# Patient Record
Sex: MALE | Race: WHITE | Employment: UNEMPLOYED | URBAN - METROPOLITAN AREA
[De-identification: names, ages, dates, MRNs, and addresses within clinical notes are randomized per-mention and may not be internally consistent; named-entity substitution may affect disease eponyms.]

---

## 2020-12-02 ENCOUNTER — APPOINTMENT (OUTPATIENT)
Dept: GENERAL RADIOLOGY | Age: 35
End: 2020-12-02
Attending: EMERGENCY MEDICINE
Payer: SUBSIDIZED

## 2020-12-02 ENCOUNTER — HOSPITAL ENCOUNTER (EMERGENCY)
Age: 35
Discharge: HOME | End: 2020-12-02
Attending: EMERGENCY MEDICINE
Payer: SUBSIDIZED

## 2020-12-02 VITALS
HEART RATE: 84 BPM | WEIGHT: 215 LBS | HEIGHT: 69 IN | TEMPERATURE: 97.1 F | OXYGEN SATURATION: 99 % | RESPIRATION RATE: 16 BRPM | BODY MASS INDEX: 31.84 KG/M2 | DIASTOLIC BLOOD PRESSURE: 75 MMHG | SYSTOLIC BLOOD PRESSURE: 118 MMHG

## 2020-12-02 DIAGNOSIS — S60.221A CONTUSION OF RIGHT HAND, INITIAL ENCOUNTER: Primary | ICD-10-CM

## 2020-12-02 PROCEDURE — 99282 EMERGENCY DEPT VISIT SF MDM: CPT

## 2020-12-02 PROCEDURE — 73130 X-RAY EXAM OF HAND: CPT

## 2020-12-03 NOTE — ED PROVIDER NOTES
EMERGENCY DEPARTMENT HISTORY AND PHYSICAL EXAM    Date: 12/2/2020  Patient Name: Ronnie Troncoso    History of Presenting Illness     Chief Complaint   Patient presents with    Hand Pain     right         History Provided By: Patient    Chief Complaint: hand pain    HPI(Context):   9:49 PM  Ronnie Troncoso is a 38 y.o. male who presents to the emergency department C/O right hand pain. Associated sxs include right hand swelling. Pt was playing with children when he struck his hand against headboard today. Pain with movement. Pt placed thumb splint. Pt denies numbness, weakness, reduced ROM, and any other sxs or complaints. PCP: None        Past History     Past Medical History:  History reviewed. No pertinent past medical history. Past Surgical History:  History reviewed. No pertinent surgical history. Family History:  History reviewed. No pertinent family history. Social History:  Social History     Tobacco Use    Smoking status: Never Smoker    Smokeless tobacco: Never Used   Substance Use Topics    Alcohol use: Never     Frequency: Never    Drug use: Never       Allergies: Allergies   Allergen Reactions    Penicillins Unknown (comments)     Pt can't recall childhood reaction         Review of Systems   Review of Systems   Musculoskeletal: Positive for arthralgias and joint swelling. Skin: Negative for color change. Neurological: Negative for weakness and numbness. All other systems reviewed and are negative. Physical Exam     Vitals:    12/02/20 2143   BP: 118/75   Pulse: 84   Resp: 16   Temp: 97.1 °F (36.2 °C)   SpO2: 99%   Weight: 97.5 kg (215 lb)   Height: 5' 9\" (1.753 m)     Physical Exam  Vitals signs and nursing note reviewed. Constitutional:       General: He is not in acute distress. Appearance: Normal appearance. Comments:  male in NAD. Alert. Appears comfortable. HENT:      Head: Normocephalic and atraumatic.       Right Ear: External ear normal. Left Ear: External ear normal.      Nose: Nose normal.   Eyes:      Conjunctiva/sclera: Conjunctivae normal.   Neck:      Musculoskeletal: Normal range of motion. Cardiovascular:      Rate and Rhythm: Normal rate and regular rhythm. Pulses:           Radial pulses are 2+ on the right side and 2+ on the left side. Heart sounds: Normal heart sounds. Pulmonary:      Effort: Pulmonary effort is normal.      Breath sounds: Normal breath sounds. Musculoskeletal:      Right wrist: He exhibits normal range of motion, no tenderness, no bony tenderness (no snuffbox tenderness) and no swelling. Right forearm: He exhibits no tenderness, no bony tenderness and no swelling. Right hand: He exhibits tenderness and swelling. He exhibits normal range of motion, normal capillary refill and no deformity. Normal sensation noted. Normal strength noted. Hands:    Neurological:      Mental Status: He is alert and oriented to person, place, and time. Psychiatric:         Behavior: Behavior normal.         Judgment: Judgment normal.             Diagnostic Study Results     Labs -   No results found for this or any previous visit (from the past 12 hour(s)). XR HAND RT MIN 3 V   Final Result   Impression:      No fracture. CT Results  (Last 48 hours)    None        CXR Results  (Last 48 hours)    None          Medications given in the ED-  Medications - No data to display      Medical Decision Making   I am the first provider for this patient. I reviewed the vital signs, available nursing notes, past medical history, past surgical history, family history and social history. Vital Signs-Reviewed the patient's vital signs. Pulse Oximetry Analysis - 99% on RA. NORMAL     Records Reviewed: Nursing Notes    Provider Notes (Medical Decision Making): sprain, strain, fx, contusion    Procedures:  Procedures    ED Course:   9:49 PM Initial assessment performed.  The patients presenting problems have been discussed, and they are in agreement with the care plan formulated and outlined with them. I have encouraged them to ask questions as they arise throughout their visit. Diagnosis and Disposition       Imaging unremarkable. NVI. Pt has thumb splint in place. Will tx as contusion v sprain. Reasons to RTED discussed with pt. All questions answered. Pt feels comfortable going home at this time. Pt expressed understanding and he agrees with plan. 1. Contusion of right hand, initial encounter        PLAN:  1. D/C Home  2. There are no discharge medications for this patient. 3.   Follow-up Information     Follow up With Specialties Details Why Contact Info    Charli Santiago MD Orthopedic Surgery   [address] [place] [address]  [address]      [facility] EMERGENCY DEPT Emergency Medicine  As needed, If symptoms worsen [address] [id number]  [phone]        _______________________________    Attestations: This note is prepared by Anupam Alejandro PA-C.  _______________________________          Please note that this dictation was completed with [employer], the computer voice recognition software. Quite often unanticipated grammatical, syntax, homophones, and other interpretive errors are inadvertently transcribed by the computer software. Please disregard these errors. Please excuse any errors that have escaped final proofreading.

## 2020-12-03 NOTE — ED TRIAGE NOTES
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 Aching, able to move it. Just wants to ensure it is not fractured.

## 2021-01-24 ENCOUNTER — HOSPITAL ENCOUNTER (EMERGENCY)
Age: 36
Discharge: HOME | End: 2021-01-24
Attending: EMERGENCY MEDICINE | Admitting: EMERGENCY MEDICINE
Payer: SUBSIDIZED

## 2021-01-24 ENCOUNTER — APPOINTMENT (OUTPATIENT)
Dept: GENERAL RADIOLOGY | Age: 36
End: 2021-01-24
Attending: PHYSICIAN ASSISTANT
Payer: SUBSIDIZED

## 2021-01-24 VITALS
OXYGEN SATURATION: 96 % | HEIGHT: 69 IN | RESPIRATION RATE: 16 BRPM | DIASTOLIC BLOOD PRESSURE: 59 MMHG | WEIGHT: 215 LBS | HEART RATE: 107 BPM | BODY MASS INDEX: 31.84 KG/M2 | SYSTOLIC BLOOD PRESSURE: 107 MMHG | TEMPERATURE: 99.4 F

## 2021-01-24 DIAGNOSIS — Z20.822 PERSON UNDER INVESTIGATION FOR COVID-19: ICD-10-CM

## 2021-01-24 DIAGNOSIS — R50.9 FEVER IN ADULT: Primary | ICD-10-CM

## 2021-01-24 DIAGNOSIS — B34.9 VIRAL SYNDROME: ICD-10-CM

## 2021-01-24 LAB
ATRIAL RATE: 122 BPM
B PERT DNA SPEC QL NAA+PROBE: NOT DETECTED
BORDETELLA PARAPERTUSSIS PCR, BORPAR: NOT DETECTED
C PNEUM DNA SPEC QL NAA+PROBE: NOT DETECTED
CALCULATED P AXIS, ECG09: 57 DEGREES
CALCULATED R AXIS, ECG10: 41 DEGREES
CALCULATED T AXIS, ECG11: 58 DEGREES
DIAGNOSIS, 93000: NORMAL
FLUAV AG NPH QL IA: NEGATIVE
FLUAV H1 2009 PAND RNA SPEC QL NAA+PROBE: NOT DETECTED
FLUAV H1 RNA SPEC QL NAA+PROBE: NOT DETECTED
FLUAV H3 RNA SPEC QL NAA+PROBE: NOT DETECTED
FLUAV SUBTYP SPEC NAA+PROBE: NOT DETECTED
FLUBV AG NOSE QL IA: NEGATIVE
FLUBV RNA SPEC QL NAA+PROBE: NOT DETECTED
HADV DNA SPEC QL NAA+PROBE: NOT DETECTED
HCOV 229E RNA SPEC QL NAA+PROBE: NOT DETECTED
HCOV HKU1 RNA SPEC QL NAA+PROBE: NOT DETECTED
HCOV NL63 RNA SPEC QL NAA+PROBE: NOT DETECTED
HCOV OC43 RNA SPEC QL NAA+PROBE: NOT DETECTED
HMPV RNA SPEC QL NAA+PROBE: NOT DETECTED
HPIV1 RNA SPEC QL NAA+PROBE: NOT DETECTED
HPIV2 RNA SPEC QL NAA+PROBE: NOT DETECTED
HPIV3 RNA SPEC QL NAA+PROBE: NOT DETECTED
HPIV4 RNA SPEC QL NAA+PROBE: NOT DETECTED
M PNEUMO DNA SPEC QL NAA+PROBE: NOT DETECTED
P-R INTERVAL, ECG05: 134 MS
Q-T INTERVAL, ECG07: 310 MS
QRS DURATION, ECG06: 88 MS
QTC CALCULATION (BEZET), ECG08: 441 MS
RSV RNA SPEC QL NAA+PROBE: NOT DETECTED
RV+EV RNA SPEC QL NAA+PROBE: NOT DETECTED
SARS-COV-2 PCR, COVPCR: NOT DETECTED
VENTRICULAR RATE, ECG03: 122 BPM

## 2021-01-24 PROCEDURE — 71045 X-RAY EXAM CHEST 1 VIEW: CPT

## 2021-01-24 PROCEDURE — 99284 EMERGENCY DEPT VISIT MOD MDM: CPT

## 2021-01-24 PROCEDURE — 74011250637 HC RX REV CODE- 250/637: Performed by: PHYSICIAN ASSISTANT

## 2021-01-24 PROCEDURE — 87426 SARSCOV CORONAVIRUS AG IA: CPT

## 2021-01-24 PROCEDURE — 87804 INFLUENZA ASSAY W/OPTIC: CPT

## 2021-01-24 PROCEDURE — 93005 ELECTROCARDIOGRAM TRACING: CPT

## 2021-01-24 RX ORDER — IBUPROFEN 600 MG/1
600 TABLET ORAL
Status: COMPLETED | OUTPATIENT
Start: 2021-01-24 | End: 2021-01-24

## 2021-01-24 RX ADMIN — IBUPROFEN 600 MG: 600 TABLET, FILM COATED ORAL at 17:20

## 2021-01-24 NOTE — ED PROVIDER NOTES
EMERGENCY DEPARTMENT HISTORY AND PHYSICAL EXAM    Date: 1/24/2021  Patient Name: Husam Morales    History of Presenting Illness     Chief Complaint   Patient presents with    Covid Testing         History Provided By: Patient    Husam Morales is a 38 y.o. male with no PMHX who presents to the emergency department C/O fever. Associated sxs include generalized fatigue, loss of taste. Reports acute onset of fever generalized fatigue loss of taste today also with generalized body aches. Endorses sick contacts in the home. States that his wife has similar symptoms cold-like symptoms however was tested yesterday for Covid with rapid screening negative results reported. Has had one other sick contact at work with known positive over 1 week ago. Non-smoker. Dose of Tylenol approximately 1 hour ago. Pt denies chest pain, shortness of breath, vomiting, and any other sxs or complaints. PCP: None        Past History     Past Medical History:  History reviewed. No pertinent past medical history. Past Surgical History:  History reviewed. No pertinent surgical history. Family History:  History reviewed. No pertinent family history. Social History:  Social History     Tobacco Use    Smoking status: Never Smoker    Smokeless tobacco: Never Used   Substance Use Topics    Alcohol use: Not Currently     Frequency: Never    Drug use: Never       Allergies: Allergies   Allergen Reactions    Penicillins Unknown (comments)     Pt can't recall childhood reaction         Review of Systems   Review of Systems   Constitutional: Positive for fatigue and fever. HENT: Negative for congestion. Respiratory: Positive for cough. Negative for shortness of breath. Cardiovascular: Negative for chest pain. Gastrointestinal: Negative for abdominal pain and vomiting. Musculoskeletal: Positive for myalgias. All other systems reviewed and are negative.       Physical Exam     Vitals:    01/24/21 1639 01/24/21 1800 BP: (!) 110/54 (!) 107/59   Pulse: (!) 125 (!) 107   Resp: 16    Temp: (!) 101.6 °F (38.7 °C) 99.4 °F (37.4 °C)   SpO2: 97% 96%   Weight: 97.5 kg (215 lb)    Height: 5' 9\" (1.753 m)      Physical Exam  Vitals signs and nursing note reviewed. Constitutional:       General: He is not in acute distress. Appearance: Normal appearance. He is normal weight. He is not ill-appearing or toxic-appearing. HENT:      Head: Normocephalic and atraumatic. Eyes:      Extraocular Movements: Extraocular movements intact. Conjunctiva/sclera: Conjunctivae normal.      Pupils: Pupils are equal, round, and reactive to light. Neck:      Musculoskeletal: Normal range of motion and neck supple. Cardiovascular:      Rate and Rhythm: Regular rhythm. Tachycardia present. Pulmonary:      Effort: Pulmonary effort is normal.   Musculoskeletal: Normal range of motion. Skin:     General: Skin is warm and dry. Capillary Refill: Capillary refill takes less than 2 seconds. Neurological:      General: No focal deficit present. Mental Status: He is alert and oriented to person, place, and time.    Psychiatric:         Mood and Affect: Mood normal.         Behavior: Behavior normal.       Diagnostic Study Results     Labs -     Recent Results (from the past 12 hour(s))   EKG, 12 LEAD, INITIAL    Collection Time: 01/24/21  5:06 PM   Result Value Ref Range    Ventricular Rate 122 BPM    Atrial Rate 122 BPM    P-R Interval 134 ms    QRS Duration 88 ms    Q-T Interval 310 ms    QTC Calculation (Bezet) 441 ms    Calculated P Axis 57 degrees    Calculated R Axis 41 degrees    Calculated T Axis 58 degrees    Diagnosis       Sinus tachycardia  Otherwise normal ECG  No previous ECGs available     INFLUENZA A & B AG (RAPID TEST)    Collection Time: 01/24/21  5:10 PM   Result Value Ref Range    Influenza A Antigen Negative NEG      Influenza B Antigen Negative NEG         Radiologic Studies -   XR CHEST PORT   Final Result   No acute process        CT Results  (Last 48 hours)    None        CXR Results  (Last 48 hours)               01/24/21 1715  XR CHEST PORT Final result    Impression:  No acute process       Narrative:  EXAM:  AP Portable Chest X-ray 1 view        INDICATION: Fever       COMPARISON: None       _______________       FINDINGS:  Heart and mediastinal contours are within normal limits for portable   radiograph. Lungs are clear of active disease. There are no pleural effusions. No acute osseous findings. ________________                     Medications given in the ED-  Medications   ibuprofen (MOTRIN) tablet 600 mg (600 mg Oral Given 1/24/21 1720)         Medical Decision Making   I am the first provider for this patient. I reviewed the vital signs, available nursing notes, past medical history, past surgical history, family history and social history. Vital Signs-Reviewed the patient's vital signs. Pulse Oximetry Analysis - 97% on RA     Records Reviewed: Nursing Notes    Procedures:  Procedures    ED Course:   4:50 PM   Initial assessment performed. The patients presenting problems have been discussed, and they are in agreement with the care plan formulated and outlined with them. I have encouraged them to ask questions as they arise throughout their visit. Initial impression patient with febrile illness was tachycardic and febrile however nontoxic-appearing. He is not tachypneic or hypoxic. Is otherwise healthy with possible exposure to Covid at work and home. Will swab for influenza Covid respiratory panel. Chest x-ray to assess for pneumonia. At this time lab work is not required    The COVID19 pandemic has been declared a public health emergency and has led to the need to minimize testing due to significant resource scarcity. I have explained to the patient the importance of home isolation and strict return precautions.     Patient has been counseled about the need for self quarantine especially if feeling ill &/or if fever is present. 6:58 PM  Temperature much improved pulse down right to 100. Oxygen saturation has been 96 and higher through entire visit. Likely Covid. Will move for discharge    Discussion: 38 y.o. male is otherwise healthy with acute onset of fever generalized fatigue loss of taste today exam and story consistent with Covid. Influenza and chest x-ray negative. Respiratory and Covid panel pending. Patient febrile but nontoxic-appearing. Vital signs normalized with antipyretics. He is not tachycardic or tachypneic he is not hypoxic. Move for discharge. Quarantine isolation and strict return precautions discussed. Diagnosis and Disposition       DISCHARGE NOTE:  Laura Monson  results have been reviewed with him. He has been counseled regarding his diagnosis, treatment, and plan. He verbally conveys understanding and agreement of the signs, symptoms, diagnosis, treatment and prognosis and additionally agrees to follow up as discussed. He also agrees with the care-plan and conveys that all of his questions have been answered. I have also provided discharge instructions for him that include: educational information regarding their diagnosis and treatment, and list of reasons why they would want to return to the ED prior to their follow-up appointment, should his condition change. He has been provided with education for proper emergency department utilization. CLINICAL IMPRESSION:    1. Fever in adult    2. Viral syndrome    3. Person under investigation for COVID-19        PLAN:  1. D/C Home  2. There are no discharge medications for this patient.     3.   Follow-up Information     Follow up With Specialties Details Why Contact Info    [facility]   for primary care [place] up [id number] [facility], [address] [facility] [address]    [facility] EMERGENCY DEPT Emergency Medicine  As needed, If symptoms worsen [address] [place] [id number]  [phone]                  Please note that this dictation was completed with [employer], the computer voice recognition software. Quite often unanticipated grammatical, syntax, homophones, and other interpretive errors are inadvertently transcribed by the computer software. Please disregard these errors. Please excuse any errors that have escaped final proofreading.

## 2021-01-24 NOTE — LETTER
NOTIFICATION RETURN TO WORK / SCHOOL 
 
1/24/2021 6:56 PM 
 
Mr. An aLaura Quesada [address] [address] To Whom It May Concern: 
 
Ana Laura Quesada is currently under the care of [facility] EMERGENCY DEPT. He will return to work 2/3/21 If there are questions or concerns please have the patient contact our office.  
 
 
 
Sincerely, 
 
 
GUERA Carroll